# Patient Record
Sex: FEMALE | URBAN - METROPOLITAN AREA
[De-identification: names, ages, dates, MRNs, and addresses within clinical notes are randomized per-mention and may not be internally consistent; named-entity substitution may affect disease eponyms.]

---

## 2018-04-20 ENCOUNTER — HOSPITAL ENCOUNTER (OUTPATIENT)
Age: 58
Setting detail: SPECIMEN
Discharge: HOME | End: 2018-04-20
Payer: COMMERCIAL

## 2018-04-24 LAB — SURGICAL PATHOLOGY REPORT: NORMAL

## 2023-05-22 ENCOUNTER — INITIAL CONSULT (OUTPATIENT)
Dept: PHYSICAL MEDICINE AND REHAB | Age: 63
End: 2023-05-22
Payer: COMMERCIAL

## 2023-05-22 VITALS
WEIGHT: 231 LBS | DIASTOLIC BLOOD PRESSURE: 78 MMHG | TEMPERATURE: 98.2 F | BODY MASS INDEX: 35.01 KG/M2 | HEIGHT: 68 IN | SYSTOLIC BLOOD PRESSURE: 120 MMHG | HEART RATE: 80 BPM

## 2023-05-22 DIAGNOSIS — G89.29 CHRONIC BILATERAL LOW BACK PAIN, UNSPECIFIED WHETHER SCIATICA PRESENT: Primary | ICD-10-CM

## 2023-05-22 DIAGNOSIS — M54.50 CHRONIC BILATERAL LOW BACK PAIN, UNSPECIFIED WHETHER SCIATICA PRESENT: Primary | ICD-10-CM

## 2023-05-22 DIAGNOSIS — M54.10 RADICULAR PAIN OF LEFT LOWER EXTREMITY: ICD-10-CM

## 2023-05-22 PROCEDURE — 99203 OFFICE O/P NEW LOW 30 MIN: CPT | Performed by: STUDENT IN AN ORGANIZED HEALTH CARE EDUCATION/TRAINING PROGRAM

## 2023-05-22 RX ORDER — ASPIRIN 81 MG/1
TABLET ORAL DAILY
COMMUNITY

## 2023-05-22 RX ORDER — ATORVASTATIN CALCIUM 20 MG/1
TABLET, FILM COATED ORAL
COMMUNITY
Start: 2023-03-08

## 2023-05-22 RX ORDER — ALENDRONATE SODIUM 70 MG/1
TABLET ORAL
COMMUNITY
Start: 2023-03-29

## 2023-06-07 ASSESSMENT — ENCOUNTER SYMPTOMS: BACK PAIN: 1

## 2023-06-07 NOTE — PROGRESS NOTES
[address] [place] [address],[address] PHYSICAL MEDICINE AND REHABILITATION  [address] [place] [id number]  Dept: [phone]  Dept Fax: [phone]    New Patient Note    Tammy Marquez is a 58y.o.-year-old female presenting with low back pain. HPI:     She reports chronic pain across the low back with activity requiring standing or walking. She states that sitting provides 100% relief of pain. She notes often having to take rest breaks during activities such as doing the dishes and mowing the lawn. She feels like back pain also increases in cold, damp weather and with stress. She notes radiation of the pain to the lateral right lower limb to the anterior thigh and knee but not more distally. She also reports numbness in the right lateral thigh as well. She denies any weakness in the right lower limb but does feel like she is not able to be as active as previously. She denies any left lower limb symptoms. She states that low back pain is present more frequently than the right lower limb pain; however, the right lower limb pain is more intense/severe than back pain when it occurs. She reports doing outpatient physical therapy a few years ago (?2019), which did not really help with pain. She states that traction during therapy helped only a little bit. She denies trialing any water therapy in the past.  She notes that chiropractic care helped some but advil did not help. She states that she has not tried heat/ice, topical medications, or any prescription medications for pain. She expresses that she does not want to take medications if she doesn't have to. She also states that she is not really interested in injections at this time. 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   She notes retiring in 2018 and states that she